# Patient Record
Sex: FEMALE | Race: WHITE | NOT HISPANIC OR LATINO | Employment: FULL TIME | URBAN - METROPOLITAN AREA
[De-identification: names, ages, dates, MRNs, and addresses within clinical notes are randomized per-mention and may not be internally consistent; named-entity substitution may affect disease eponyms.]

---

## 2025-02-26 ENCOUNTER — HOSPITAL ENCOUNTER (EMERGENCY)
Facility: HOSPITAL | Age: 65
Discharge: HOME | End: 2025-02-26
Attending: EMERGENCY MEDICINE
Payer: COMMERCIAL

## 2025-02-26 VITALS
OXYGEN SATURATION: 98 % | DIASTOLIC BLOOD PRESSURE: 74 MMHG | HEIGHT: 67 IN | TEMPERATURE: 97.7 F | SYSTOLIC BLOOD PRESSURE: 142 MMHG | HEART RATE: 78 BPM | BODY MASS INDEX: 21.97 KG/M2 | RESPIRATION RATE: 16 BRPM | WEIGHT: 140 LBS

## 2025-02-26 DIAGNOSIS — F63.81 INTERMITTENT EXPLOSIVE: Primary | ICD-10-CM

## 2025-02-26 PROCEDURE — 99283 EMERGENCY DEPT VISIT LOW MDM: CPT | Performed by: EMERGENCY MEDICINE

## 2025-02-26 SDOH — HEALTH STABILITY: MENTAL HEALTH

## 2025-02-26 SDOH — HEALTH STABILITY: MENTAL HEALTH: BEHAVIORAL HEALTH(WDL): EXCEPTIONS TO WDL

## 2025-02-26 ASSESSMENT — LIFESTYLE VARIABLES
EVER HAD A DRINK FIRST THING IN THE MORNING TO STEADY YOUR NERVES TO GET RID OF A HANGOVER: NO
EVER FELT BAD OR GUILTY ABOUT YOUR DRINKING: NO
TOTAL SCORE: 0
HAVE YOU EVER FELT YOU SHOULD CUT DOWN ON YOUR DRINKING: NO
HAVE PEOPLE ANNOYED YOU BY CRITICIZING YOUR DRINKING: NO

## 2025-02-26 ASSESSMENT — COLUMBIA-SUICIDE SEVERITY RATING SCALE - C-SSRS
1. IN THE PAST MONTH, HAVE YOU WISHED YOU WERE DEAD OR WISHED YOU COULD GO TO SLEEP AND NOT WAKE UP?: NO
6. HAVE YOU EVER DONE ANYTHING, STARTED TO DO ANYTHING, OR PREPARED TO DO ANYTHING TO END YOUR LIFE?: NO
2. HAVE YOU ACTUALLY HAD ANY THOUGHTS OF KILLING YOURSELF?: NO

## 2025-02-26 ASSESSMENT — PAIN SCALES - GENERAL
PAINLEVEL_OUTOF10: 0 - NO PAIN

## 2025-02-26 ASSESSMENT — PAIN - FUNCTIONAL ASSESSMENT: PAIN_FUNCTIONAL_ASSESSMENT: 0-10

## 2025-02-26 NOTE — ED PROVIDER NOTES
"HPI   Chief Complaint   Patient presents with    Psychiatric Evaluation     BIBS from home. States she was discussing the large increase in her monthly gas bill with the gas company. States they were unsympathetic and she made the comment \"I should just go jump into traffic\". Gas company employee called PD who pink slipped her and called EMS to bring her to this ED           Is a 64-year-old female brought in by police because she made suicidal statements.  Patient reports that she was on the phone with the gas company and she was concerned because her bill increased by 5 times.  She called them and told them that she was concerned there was an error.  She states that they reported to her that it was an estimate and that she owes now $500.  Patient reports that she has had this on the phone with them and then the police came and picked her up after she got off the phone.  Patient denies any prior history of suicidal statements or thoughts.  She denies any depression.  She reports that she has children as well as grandchildren and she states she would never commit suicide.  Patient denies any tobacco use.  No drug use.  She states occasional alcohol however rarely.      History provided by:  Patient          Patient History   Past Medical History:   Diagnosis Date    Anxiety disorder, unspecified 10/09/2014    Anxiety and depression    Personal history of other diseases of the nervous system and sense organs     History of sleep disturbance     Past Surgical History:   Procedure Laterality Date    CARPAL TUNNEL RELEASE  10/09/2014    Neuroplasty Median Nerve At Carpal Tunnel     No family history on file.  Social History     Tobacco Use    Smoking status: Never    Smokeless tobacco: Never   Vaping Use    Vaping status: Never Used   Substance Use Topics    Alcohol use: Never    Drug use: Never       Physical Exam   ED Triage Vitals [02/26/25 1736]   Temperature Heart Rate Respirations BP   36.6 °C (97.9 °F) 80 16 " 149/76      Pulse Ox Temp Source Heart Rate Source Patient Position   97 % Temporal -- --      BP Location FiO2 (%)     -- --       Physical Exam  Vitals and nursing note reviewed.   Constitutional:       General: She is not in acute distress.     Appearance: Normal appearance. She is not toxic-appearing.   HENT:      Head: Normocephalic and atraumatic.      Nose: Nose normal.      Mouth/Throat:      Mouth: Mucous membranes are moist.      Pharynx: Oropharynx is clear.   Eyes:      Extraocular Movements: Extraocular movements intact.      Conjunctiva/sclera: Conjunctivae normal.      Pupils: Pupils are equal, round, and reactive to light.   Cardiovascular:      Rate and Rhythm: Regular rhythm.      Pulses: Normal pulses.      Heart sounds: Normal heart sounds.   Pulmonary:      Effort: Pulmonary effort is normal. No respiratory distress.      Breath sounds: Normal breath sounds.   Abdominal:      General: Abdomen is flat. Bowel sounds are normal.      Palpations: Abdomen is soft.      Tenderness: There is no abdominal tenderness.   Musculoskeletal:         General: Normal range of motion.      Cervical back: Normal range of motion and neck supple.   Skin:     General: Skin is warm and dry.      Coloration: Skin is not jaundiced or pale.      Findings: No bruising.   Neurological:      General: No focal deficit present.      Mental Status: She is alert and oriented to person, place, and time. Mental status is at baseline.   Psychiatric:         Attention and Perception: Attention and perception normal.         Mood and Affect: Mood normal.         Speech: Speech normal.         Behavior: Behavior normal.         Thought Content: Thought content normal. Thought content does not include homicidal or suicidal ideation. Thought content does not include homicidal or suicidal plan.         Cognition and Memory: Cognition normal.         Judgment: Judgment normal.           ED Course & MDM   Diagnoses as of 02/26/25 1810  "  Intermittent explosive                 No data recorded     Alee Coma Scale Score: 15 (02/26/25 8209 : Ana Aiken RN)                           Medical Decision Making  Summary:  Medical Decision Making:   Patient presented as described in HPI. Patient case including ROS, PE, and treatment and plan discussed with ED attending if attached as cosigner. Results from labs and or imaging included below if completed. Na Cool  is a 64 y.o. coming in for Patient presents with:  Psychiatric Evaluation: BIBS from home. States she was discussing the large increase in her monthly gas bill with the gas company. States they were unsympathetic and she made the comment \"I should just go jump into traffic\". Gas company employee called PD who pink slipped her and called EMS to bring her to this ED  .  Patient does admit that she said that she would jump into traffic but she reports that she said this because she was angry.  Patient reports that she does not feel suicidal.  She has never felt suicidal and does not feel like she would never commit suicide.  Patient has a good family support system.  She has clear insight and will follow-up closely with her primary care provider.  Patient will be discharged with close PCP follow-up.  She reports that she does have a job and income.  She has good insight and judgment.  She does have future thought plans.  Patient was staffed with ER attending for disposition of discharge.       Disposition is completed with shared decision making with the patient or guardian present with the patient. They were advised to follow up with PCP or recommended provider in 2-3 days for another evaluation and exam. I advised the patient to return or go to closest emergency room immediately if symptoms change, get worse, or new symptoms develop prior to follow up. I explained the plan and treatment course. Patient/guardian is in agreement with plan, treatment course, and follow up and state " that they will comply.    /Labs Reviewed - No data to display   No orders to display                         Tests/Medications/Escalations of Care considered but not given: As in MDM    Patient care discussed with: N/A  Social Determinants affecting care: N/A    Final diagnosis and disposition as documented     Diagnoses as of 02/26/25 1813  Intermittent explosive       Shared decision making was completed and determined that patient will be discharged. I discussed the differential; results and discharge plan with the patient and/or family/friend/caregiver if present.  I emphasized the importance of follow-up with the physician I referred them to in the timeframe recommended.  I explained reasons for the patient to return to the Emergency Department. They agreed that if they feel their condition is worsening or if they have any other concern they should call 911 immediately for further assistance. I gave the patient an opportunity to ask all questions they had and answered all of them accordingly. They understand return precautions and discharge instructions. The patient and/or family/friend/caregiver expressed understanding verbally and that they would comply.     Disposition: Discharge      This note has been transcribed using voice recognition and may contain grammatical errors, misplaced words, incorrect words, incorrect phrases or other errors.         Procedure  Procedures     Vinnie Cruz PA-C  02/26/25 1815       Vinnie Cruz PA-C  02/26/25 1815

## 2025-07-14 ENCOUNTER — DOCUMENTATION (OUTPATIENT)
Dept: HOME HEALTH SERVICES | Facility: HOME HEALTH | Age: 65
End: 2025-07-14
Payer: COMMERCIAL

## 2025-07-14 ENCOUNTER — HOME HEALTH ADMISSION (OUTPATIENT)
Dept: HOME HEALTH SERVICES | Facility: HOME HEALTH | Age: 65
End: 2025-07-14
Payer: COMMERCIAL

## 2025-07-14 NOTE — HH CARE COORDINATION
Home Care received a Referral for Physical Therapy. We have processed the referral for a Start of Care on 7/15/25.     If you have any questions or concerns, please feel free to contact us at [phone]. Follow the prompts, enter your five digit zip code, and you will be directed to your care team on [facility].

## 2025-07-16 ENCOUNTER — HOME CARE VISIT (OUTPATIENT)
Dept: HOME HEALTH SERVICES | Facility: HOME HEALTH | Age: 65
End: 2025-07-16
Payer: COMMERCIAL

## 2025-07-16 VITALS
TEMPERATURE: 97.5 F | HEART RATE: 75 BPM | SYSTOLIC BLOOD PRESSURE: 88 MMHG | DIASTOLIC BLOOD PRESSURE: 58 MMHG | RESPIRATION RATE: 18 BRPM | OXYGEN SATURATION: 94 %

## 2025-07-16 PROCEDURE — G0151 HHCP-SERV OF PT,EA 15 MIN: HCPCS

## 2025-07-16 SDOH — HEALTH STABILITY: PHYSICAL HEALTH: EXERCISE COMMENTS: THA PROTOCOL: X 10  -ANKLE PUMPS  -QUAD SETS  -GLUTE SETS  -HEEL SLIDES  -HIP ABD/ADD  -SAQ

## 2025-07-16 ASSESSMENT — ENCOUNTER SYMPTOMS
AGGRESSION WITHIN DEFINED LIMITS: 1
SLEEP QUALITY: ADEQUATE
ANGER WITHIN DEFINED LIMITS: 1

## 2025-07-16 ASSESSMENT — ACTIVITIES OF DAILY LIVING (ADL)
ENTERING_EXITING_HOME: MINIMUM ASSIST
AMBULATION ASSISTANCE ON FLAT SURFACES: 1
OASIS_M1830: 05

## 2025-07-18 ENCOUNTER — HOME CARE VISIT (OUTPATIENT)
Dept: HOME HEALTH SERVICES | Facility: HOME HEALTH | Age: 65
End: 2025-07-18
Payer: COMMERCIAL

## 2025-07-18 VITALS
RESPIRATION RATE: 12 BRPM | TEMPERATURE: 98.5 F | HEART RATE: 72 BPM | OXYGEN SATURATION: 98 % | DIASTOLIC BLOOD PRESSURE: 58 MMHG | SYSTOLIC BLOOD PRESSURE: 98 MMHG

## 2025-07-18 PROCEDURE — G0157 HHC PT ASSISTANT EA 15: HCPCS | Mod: CQ

## 2025-07-18 ASSESSMENT — ENCOUNTER SYMPTOMS
PERSON REPORTING PAIN: PATIENT
PAIN: 1

## 2025-07-19 ASSESSMENT — ENCOUNTER SYMPTOMS
SUBJECTIVE PAIN PROGRESSION: GRADUALLY IMPROVING
PAIN LOCATION - PAIN DURATION: SINCE SURGERY
LOWEST PAIN SEVERITY IN PAST 24 HOURS: 2/10
PAIN LOCATION - PAIN FREQUENCY: FREQUENT
PAIN SEVERITY GOAL: 0/10
PAIN LOCATION - PAIN SEVERITY: 2/10
PAIN LOCATION: RIGHT HIP
HIGHEST PAIN SEVERITY IN PAST 24 HOURS: 5/10

## 2025-07-21 ENCOUNTER — HOME CARE VISIT (OUTPATIENT)
Dept: HOME HEALTH SERVICES | Facility: HOME HEALTH | Age: 65
End: 2025-07-21
Payer: COMMERCIAL

## 2025-07-21 VITALS
HEART RATE: 65 BPM | RESPIRATION RATE: 12 BRPM | SYSTOLIC BLOOD PRESSURE: 100 MMHG | TEMPERATURE: 98.3 F | OXYGEN SATURATION: 97 % | DIASTOLIC BLOOD PRESSURE: 58 MMHG

## 2025-07-21 PROCEDURE — G0157 HHC PT ASSISTANT EA 15: HCPCS | Mod: CQ

## 2025-07-21 ASSESSMENT — ENCOUNTER SYMPTOMS
HIGHEST PAIN SEVERITY IN PAST 24 HOURS: 5/10
PAIN LOCATION: RIGHT HIP
PAIN LOCATION - PAIN SEVERITY: 3/10
PAIN LOCATION - PAIN DURATION: SINCE SURGERY
PAIN: 1
PERSON REPORTING PAIN: PATIENT
SUBJECTIVE PAIN PROGRESSION: WAXING AND WANING
PAIN LOCATION - PAIN FREQUENCY: FREQUENT
PAIN LOCATION - EXACERBATING FACTORS: ACTIVITY
PAIN SEVERITY GOAL: 0/10
LOWEST PAIN SEVERITY IN PAST 24 HOURS: 1/10

## 2025-07-25 ENCOUNTER — HOME CARE VISIT (OUTPATIENT)
Dept: HOME HEALTH SERVICES | Facility: HOME HEALTH | Age: 65
End: 2025-07-25
Payer: COMMERCIAL

## 2025-07-25 VITALS
RESPIRATION RATE: 12 BRPM | SYSTOLIC BLOOD PRESSURE: 108 MMHG | DIASTOLIC BLOOD PRESSURE: 62 MMHG | TEMPERATURE: 98.2 F | OXYGEN SATURATION: 98 % | HEART RATE: 78 BPM

## 2025-07-25 PROCEDURE — G0157 HHC PT ASSISTANT EA 15: HCPCS | Mod: CQ

## 2025-07-25 ASSESSMENT — ENCOUNTER SYMPTOMS
PAIN LOCATION - EXACERBATING FACTORS: ACTIVITY, TIME OF DAY
PAIN LOCATION: RIGHT HIP
PAIN: 1
PAIN LOCATION - PAIN DURATION: SINCE SURGERY
PAIN SEVERITY GOAL: 0/10
PAIN LOCATION - PAIN QUALITY: ACHING, TIGHT
PAIN LOCATION - PAIN FREQUENCY: FREQUENT
PAIN LOCATION - PAIN SEVERITY: 1/10
PERSON REPORTING PAIN: PATIENT
HIGHEST PAIN SEVERITY IN PAST 24 HOURS: 3/10
LOWEST PAIN SEVERITY IN PAST 24 HOURS: 1/10
SUBJECTIVE PAIN PROGRESSION: GRADUALLY IMPROVING

## 2025-07-25 NOTE — CASE COMMUNICATION
Patient is at end of POC next week and is functionally ready for agency discharge. Patient is aware of plan and in agreement at this time.[facility] reviewed and signed electronically by patient with end date of 8/1/2025.

## 2025-07-31 ENCOUNTER — HOME CARE VISIT (OUTPATIENT)
Dept: HOME HEALTH SERVICES | Facility: HOME HEALTH | Age: 65
End: 2025-07-31
Payer: COMMERCIAL

## 2025-07-31 ASSESSMENT — ACTIVITIES OF DAILY LIVING (ADL)
OASIS_M1830: 00
HOME_HEALTH_OASIS: 00